# Patient Record
Sex: FEMALE | Race: BLACK OR AFRICAN AMERICAN | NOT HISPANIC OR LATINO | Employment: FULL TIME | URBAN - METROPOLITAN AREA
[De-identification: names, ages, dates, MRNs, and addresses within clinical notes are randomized per-mention and may not be internally consistent; named-entity substitution may affect disease eponyms.]

---

## 2017-09-24 ENCOUNTER — HOSPITAL ENCOUNTER (EMERGENCY)
Facility: HOSPITAL | Age: 22
Discharge: HOME | End: 2017-09-24
Payer: MEDICAID

## 2017-09-24 VITALS
HEIGHT: 63 IN | RESPIRATION RATE: 18 BRPM | OXYGEN SATURATION: 99 % | TEMPERATURE: 98 F | HEART RATE: 80 BPM | BODY MASS INDEX: 31.71 KG/M2 | DIASTOLIC BLOOD PRESSURE: 72 MMHG | SYSTOLIC BLOOD PRESSURE: 127 MMHG | WEIGHT: 179 LBS

## 2017-09-24 DIAGNOSIS — M76.60 ACHILLES TENDINITIS, UNSPECIFIED LATERALITY: Primary | ICD-10-CM

## 2017-09-24 PROCEDURE — 99283 EMERGENCY DEPT VISIT LOW MDM: CPT

## 2017-09-24 RX ORDER — DICLOFENAC SODIUM 75 MG/1
75 TABLET, DELAYED RELEASE ORAL 2 TIMES DAILY
Qty: 20 TABLET | Refills: 0 | Status: SHIPPED | OUTPATIENT
Start: 2017-09-24 | End: 2018-01-23 | Stop reason: CLARIF

## 2017-09-24 RX ORDER — PREDNISONE 20 MG/1
40 TABLET ORAL DAILY
Qty: 10 TABLET | Refills: 0 | Status: SHIPPED | OUTPATIENT
Start: 2017-09-24 | End: 2017-09-29

## 2017-09-24 NOTE — ED NOTES
Pt examined by Robinson VILLANUEVA  without RN, educated on prescriptions, given discharge instructions and discharged to [facility]. See provider notes for exam.

## 2017-09-24 NOTE — ED PROVIDER NOTES
SCRIBE #1 NOTE: I, Nila Brown, am scribing for, and in the presence of, KAY Coyne. I have scribed the entire note.      History      Chief Complaint   Patient presents with    Ankle Pain     pt c/o R ankle pain, worse when walking, denies trauma/injury, x1 day       Review of patient's allergies indicates:   Allergen Reactions    Benadryl [diphenhydramine hcl]     Codeine         HPI   HPI    9/24/2017, 12:14 PM   History obtained from the patient      History of Present Illness: Opal Perez is a 22 y.o. female patient who presents to the Emergency Department for R heel pain which onset gradually this morning after waking. She denies any injury. Pt reports that she usually has problems with her heel. She states pain is worse with walking. Pt states that she tailgated yesterday and reports walking a lot in sandals. The pain has been constant and mild in severity. She describes the pain as pulling or tearing. No mitigating factors reported. No other sxs reported. Patient denies extremity weakness/numbness, joint swelling, decreased ROM of foot, paresthesias, ecchymosis, and all other sxs at this time. No further complaints or concerns at this time.       Arrival mode: Personal vehicle    PCP: KELSEY James     Past Medical History:  Past medical history reviewed not relevant      Past Surgical History:  Past surgical history reviewed not relevant      Family History:  Family history reviewed not relevant      Social History:  Social History    Social History Main Topics    Social History Main Topics    Smoking status: Unknown if ever smoked    Smokeless tobacco: Unknown if ever used    Alcohol Use: Unknown drinking history    Drug Use: Unknown if ever used    Sexual Activity: Unknown         ROS   Review of Systems   Constitutional: Negative for fever.   HENT: Negative for sore throat.    Respiratory: Negative for shortness of breath.    Cardiovascular: Negative for chest pain.  "  Gastrointestinal: Negative for nausea.   Genitourinary: Negative for dysuria.   Musculoskeletal: Negative for back pain, gait problem and joint swelling.        (+) R heel pain   Skin: Negative for color change and rash.   Neurological: Negative for weakness and numbness.        (-) paresthesias   Hematological: Does not bruise/bleed easily.   All other systems reviewed and are negative.      Physical Exam      Initial Vitals [09/24/17 1125]   BP Pulse Resp Temp SpO2   127/72 80 18 98 °F (36.7 °C) 99 %      MAP       90.33          Physical Exam  Nursing Notes and Vital Signs Reviewed.  Constitutional: Patient is in no acute distress. Awake and alert. Well-developed and well-nourished.  Head: Atraumatic. Normocephalic.  Eyes: PERRL. EOM intact. Conjunctivae are not pale. No scleral icterus.  ENT: Mucous membranes are moist. Oropharynx is clear and symmetric.    Neck: Supple. Full ROM.   Cardiovascular: Regular rate. Regular rhythm. No murmurs, rubs, or gallops. Distal pulses are 2+ and symmetric.  Pulmonary/Chest: No respiratory distress. Clear to auscultation bilaterally. No wheezing, rales, or rhonchi.  Abdominal: Soft. Non-distended.    Musculoskeletal: Moves all extremities. No obvious deformities. Tenderness along the right achilles at the insertion along the calcaneus. There is no swelling. No bony tenderness. Distal capillary refill takes less than 2 seconds.  PT and DP pulses are 2+ bilaterally.  Skin: Warm and dry.  Neurological:  Alert, awake, and appropriate.  Normal speech.  No acute focal neurological deficits are appreciated.  Psychiatric: Normal affect. Good eye contact. Appropriate in content.    ED Course    Procedures  ED Vital Signs:  Vitals:    09/24/17 1125   BP: 127/72   Pulse: 80   Resp: 18   Temp: 98 °F (36.7 °C)   TempSrc: Oral   SpO2: 99%   Weight: 81.2 kg (179 lb)   Height: 5' 3" (1.6 m)       The Emergency Provider reviewed the vital signs and test results, which are outlined " above.    ED Discussion     12:18 PM: Discussed pt dx and plan of tx. Informed pt to follow up with PCP. Informed patient to avoid wearing sandals while doing activities that require a lot of walking. Advised patient to wear comfortable shoes. All questions and concerns were addressed at this time. Pt expresses understanding of information and instructions, and is comfortable with plan to discharge. Pt is stable for discharge.    I discussed with patient and/or family/caretaker that evaluation in the ED does not suggest any emergent or life threatening medical conditions requiring immediate intervention beyond what was provided in the ED, and I believe patient is safe for discharge.  Regardless, an unremarkable evaluation in the ED does not preclude the development or presence of a serious of life threatening condition. As such, patient was instructed to return immediately for any worsening or change in current symptoms.      ED Medication(s):  Medications - No data to display    New Prescriptions    DICLOFENAC (VOLTAREN) 75 MG EC TABLET    Take 1 tablet (75 mg total) by mouth 2 (two) times daily.    PREDNISONE (DELTASONE) 20 MG TABLET    Take 2 tablets (40 mg total) by mouth once daily.       Follow-up Information     KELSEY James. Go in 2 days.    Specialty:  Family Medicine  Contact information:  [id number]  [place] [address] [phone]                    Medical Decision Making              Scribe Attestation:   Scribe #1: I performed the above scribed service and the documentation accurately describes the services I performed. I attest to the accuracy of the note.    Attending:   Physician Attestation Statement for Scribe #1: I, KAY Coyne, personally performed the services described in this documentation, as scribed by Nila Brown, in my presence, and it is both accurate and complete.         Attending Attestation:     Physician Attestation Statement for NP/PA:   I discussed  this assessment and plan of this patient with the NP/PA, but I did not personally examine the patient. The face to face encounter was performed by the NP/PA.              Clinical Impression       ICD-10-CM ICD-9-CM   1. Achilles tendinitis, unspecified laterality M76.60 726.71       Disposition:   Disposition: Discharged  Condition: Stable           KAY Gutierrez  09/24/17 1223       Peter Linares MD  09/24/17 7824

## 2018-01-23 ENCOUNTER — HOSPITAL ENCOUNTER (EMERGENCY)
Facility: HOSPITAL | Age: 23
Discharge: HOME | End: 2018-01-23
Payer: MEDICAID

## 2018-01-23 VITALS
RESPIRATION RATE: 18 BRPM | BODY MASS INDEX: 30.54 KG/M2 | OXYGEN SATURATION: 97 % | HEART RATE: 96 BPM | HEIGHT: 64 IN | WEIGHT: 178.88 LBS | DIASTOLIC BLOOD PRESSURE: 90 MMHG | SYSTOLIC BLOOD PRESSURE: 127 MMHG | TEMPERATURE: 100 F

## 2018-01-23 DIAGNOSIS — R50.9 FEVER: ICD-10-CM

## 2018-01-23 DIAGNOSIS — R68.89 FLU-LIKE SYMPTOMS: ICD-10-CM

## 2018-01-23 DIAGNOSIS — B34.9 VIRAL SYNDROME: Primary | ICD-10-CM

## 2018-01-23 LAB
FLUAV AG SPEC QL IA: NEGATIVE
FLUBV AG SPEC QL IA: NEGATIVE
SPECIMEN SOURCE: NORMAL

## 2018-01-23 PROCEDURE — 25000003 PHARM REV CODE 250: Performed by: PHYSICIAN ASSISTANT

## 2018-01-23 PROCEDURE — 87400 INFLUENZA A/B EACH AG IA: CPT | Mod: 59

## 2018-01-23 PROCEDURE — 99284 EMERGENCY DEPT VISIT MOD MDM: CPT

## 2018-01-23 RX ORDER — ACETAMINOPHEN 500 MG
1000 TABLET ORAL
Status: COMPLETED | OUTPATIENT
Start: 2018-01-23 | End: 2018-01-23

## 2018-01-23 RX ORDER — PROMETHAZINE HYDROCHLORIDE AND DEXTROMETHORPHAN HYDROBROMIDE 6.25; 15 MG/5ML; MG/5ML
SYRUP ORAL
Qty: 120 ML | Refills: 0 | Status: SHIPPED | OUTPATIENT
Start: 2018-01-23 | End: 2018-02-22 | Stop reason: CLARIF

## 2018-01-23 RX ADMIN — ACETAMINOPHEN 1000 MG: 500 TABLET ORAL at 01:01

## 2018-01-23 NOTE — ED PROVIDER NOTES
Encounter Date: 2018       History     Chief Complaint   Patient presents with    General Illness     pt c/o fever, chills, body aches x2 days     The patient presents to the ER for an emergent evaluation due to cold and flu symptoms for the past 24-48 hours. She reports having fever, chills, cough, congestion, nasal drainage, and body aches. She states that the degree is mild to moderate. She states that the course is constant. She denies any additional symptoms. She did not get a flu shot this year. She denies any pre-arrival treatment. 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           Review of patient's allergies indicates:   Allergen Reactions    Benadryl [diphenhydramine hcl] Hives    Codeine Hives     Past Medical History:   Diagnosis Date    Anemia     Asthma      Past Surgical History:   Procedure Laterality Date    INDUCED       TONSILLECTOMY, ADENOIDECTOMY       No family history on file.  Social History   Substance Use Topics    Smoking status: Never Smoker    Smokeless tobacco: Never Used    Alcohol use Yes     Review of Systems   Constitutional: Positive for chills and fever. Negative for activity change.   HENT: Positive for congestion and rhinorrhea. Negative for ear pain, sore throat and trouble swallowing.    Eyes: Negative for discharge and redness.   Respiratory: Positive for cough. Negative for shortness of breath, wheezing and stridor.    Cardiovascular: Negative for chest pain, palpitations and leg swelling.   Gastrointestinal: Negative for abdominal pain, blood in stool, diarrhea, nausea and vomiting.   Genitourinary: Negative for decreased urine volume, dysuria, flank pain, frequency, menstrual problem and pelvic pain.   Musculoskeletal: Positive for myalgias. Negative for arthralgias, back pain, gait problem and neck pain.   Skin: Negative for rash.   Neurological: Negative for dizziness, seizures, syncope, weakness, light-headedness, numbness and  headaches.   Psychiatric/Behavioral: Negative for confusion.       Physical Exam     Initial Vitals [01/23/18 1238]   BP Pulse Resp Temp SpO2   (!) 127/90 96 18 (!) 102.1 °F (38.9 °C) 97 %      MAP       102.33         Physical Exam    Nursing note and vitals reviewed.  Constitutional: She appears well-developed and well-nourished. She is not diaphoretic.   Alert and ambulatory. Appears to be in mild distress. Non-toxic appearance presently.    HENT:   Mouth/Throat: Oropharynx is clear and moist.   Swollen nasal mucosa. Rhinorrhea. Normal otic exam. No adenopathy. Clear throat. No hoarseness.    Eyes: Conjunctivae are normal. Pupils are equal, round, and reactive to light. Right eye exhibits no discharge. Left eye exhibits no discharge. No scleral icterus.   Neck: Normal range of motion. Neck supple.   Non-tender. No nuchal rigidity.    Cardiovascular: Normal rate, regular rhythm and intact distal pulses.   Pulmonary/Chest: Breath sounds normal. No respiratory distress. She has no wheezes. She has no rhonchi. She has no rales.   Occasional cough. No exertional dyspnea. No tachypnea or hypoxia.    Abdominal: Soft. She exhibits no distension. There is no tenderness. There is no rebound and no guarding.   Benign abdomen.    Musculoskeletal: Normal range of motion. She exhibits no edema.   Lymphadenopathy:     She has no cervical adenopathy.   Neurological: She is alert and oriented to person, place, and time. She has normal strength. No cranial nerve deficit or sensory deficit.   Normal gait. No focal deficit presently.    Skin: Skin is warm and dry. No rash noted.   Psychiatric: She has a normal mood and affect. Her behavior is normal.         ED Course   Procedures  Labs Reviewed   INFLUENZA A AND B ANTIGEN     Results for orders placed or performed during the hospital encounter of 01/23/18   Influenza antigen Nasopharyngeal Swab   Result Value Ref Range    Influenza A Ag, EIA Negative Negative    Influenza B Ag, EIA  "Negative Negative    Flu A & B Source Nasopharyngeal Swab      Vitals:    01/23/18 1238 01/23/18 1420   BP: (!) 127/90    BP Location: Left arm    Patient Position: Sitting    Pulse: 96    Resp: 18    Temp: (!) 102.1 °F (38.9 °C) 100.1 °F (37.8 °C)   TempSrc: Oral Oral   SpO2: 97%    Weight: 81.1 kg (178 lb 14.5 oz)    Height: 5' 4" (1.626 m)      Imaging Results          X-Ray Chest PA And Lateral (Final result)  Result time 01/23/18 14:06:38    Final result by LUCIO Branch Sr., MD (01/23/18 14:06:38)                 Impression:        1. The lungs are clear.  2. There is a mild amount of dextroconvex curvature of the thoracic spine.      Electronically signed by: LUCIO BRANCH MD  Date:     01/23/18  Time:    14:06              Narrative:    Two-view chest x-ray    Clinical History:  Fever, unspecified    Finding: The size and contour of the heart are normal. The lungs are clear. There is no pneumothorax or pleural effusion. There is a mild amount of dextroconvex curvature of the thoracic spine.                                        Medical Decision Making:   History:   Old Medical Records: I decided to obtain old medical records.  Initial Assessment:   Cold and flu symptoms for < 48 hours   Differential Diagnosis:   Influenza, Viral syndrome, Pneumonia, Dehydration, Meningitis, Sepsis, Cold, Sinusitis, Bronchitis, URI, HIV, Pregnancy, UTI, etc   Clinical Tests:   Lab Tests: Ordered and Reviewed                   ED Course      Clinical Impression:   The primary encounter diagnosis was Viral syndrome. Diagnoses of Fever and Flu-like symptoms were also pertinent to this visit.    Disposition:   Disposition: Discharged  Condition: Stable                        Rich Keith PA-C  01/23/18 1425    "

## 2018-02-22 ENCOUNTER — HOSPITAL ENCOUNTER (EMERGENCY)
Facility: HOSPITAL | Age: 23
Discharge: HOME | End: 2018-02-22
Payer: MEDICAID

## 2018-02-22 VITALS
RESPIRATION RATE: 16 BRPM | HEART RATE: 86 BPM | OXYGEN SATURATION: 96 % | DIASTOLIC BLOOD PRESSURE: 83 MMHG | WEIGHT: 180.13 LBS | TEMPERATURE: 99 F | HEIGHT: 64 IN | SYSTOLIC BLOOD PRESSURE: 139 MMHG | BODY MASS INDEX: 30.75 KG/M2

## 2018-02-22 DIAGNOSIS — O21.9 VOMITING OR NAUSEA OF PREGNANCY: ICD-10-CM

## 2018-02-22 DIAGNOSIS — Z32.01 ENCOUNTER FOR PREGNANCY TEST, RESULT POSITIVE: Primary | ICD-10-CM

## 2018-02-22 LAB
B-HCG UR QL: POSITIVE
BILIRUB UR QL STRIP: NEGATIVE
CLARITY UR: CLEAR
COLOR UR: YELLOW
GLUCOSE UR QL STRIP: NEGATIVE
HGB UR QL STRIP: ABNORMAL
KETONES UR QL STRIP: NEGATIVE
LEUKOCYTE ESTERASE UR QL STRIP: ABNORMAL
MICROSCOPIC COMMENT: ABNORMAL
NITRITE UR QL STRIP: NEGATIVE
PH UR STRIP: 6 [PH] (ref 5–8)
PROT UR QL STRIP: NEGATIVE
RBC #/AREA URNS HPF: 3 /HPF (ref 0–4)
SP GR UR STRIP: 1.02 (ref 1–1.03)
URN SPEC COLLECT METH UR: ABNORMAL
UROBILINOGEN UR STRIP-ACNC: NEGATIVE EU/DL
WBC #/AREA URNS HPF: 8 /HPF (ref 0–5)

## 2018-02-22 PROCEDURE — 81025 URINE PREGNANCY TEST: CPT

## 2018-02-22 PROCEDURE — 99283 EMERGENCY DEPT VISIT LOW MDM: CPT

## 2018-02-22 PROCEDURE — 81000 URINALYSIS NONAUTO W/SCOPE: CPT

## 2018-02-22 RX ORDER — NORGESTIMATE AND ETHINYL ESTRADIOL 7DAYSX3 LO
1 KIT ORAL DAILY
COMMUNITY
End: 2019-09-16

## 2018-02-22 RX ORDER — PROMETHAZINE HYDROCHLORIDE 25 MG/1
25 TABLET ORAL EVERY 6 HOURS PRN
Qty: 15 TABLET | Refills: 0 | Status: SHIPPED | OUTPATIENT
Start: 2018-02-22 | End: 2019-09-16

## 2019-09-16 ENCOUNTER — HOSPITAL ENCOUNTER (EMERGENCY)
Facility: HOSPITAL | Age: 24
Discharge: HOME | End: 2019-09-16
Attending: EMERGENCY MEDICINE
Payer: MEDICAID

## 2019-09-16 VITALS
SYSTOLIC BLOOD PRESSURE: 145 MMHG | RESPIRATION RATE: 16 BRPM | TEMPERATURE: 99 F | BODY MASS INDEX: 30.93 KG/M2 | HEIGHT: 64 IN | DIASTOLIC BLOOD PRESSURE: 78 MMHG | OXYGEN SATURATION: 100 % | HEART RATE: 75 BPM | WEIGHT: 181.19 LBS

## 2019-09-16 DIAGNOSIS — R10.2 PELVIC PAIN: Primary | ICD-10-CM

## 2019-09-16 LAB
ALBUMIN SERPL BCP-MCNC: 3.8 G/DL (ref 3.5–5.2)
ALP SERPL-CCNC: 49 U/L (ref 55–135)
ALT SERPL W/O P-5'-P-CCNC: 12 U/L (ref 10–44)
ANION GAP SERPL CALC-SCNC: 8 MMOL/L (ref 8–16)
AST SERPL-CCNC: 14 U/L (ref 10–40)
B-HCG UR QL: NEGATIVE
BACTERIA GENITAL QL WET PREP: ABNORMAL
BASOPHILS # BLD AUTO: 0.01 K/UL (ref 0–0.2)
BASOPHILS NFR BLD: 0.3 % (ref 0–1.9)
BILIRUB SERPL-MCNC: 0.4 MG/DL (ref 0.1–1)
BILIRUB UR QL STRIP: NEGATIVE
BUN SERPL-MCNC: 12 MG/DL (ref 6–20)
C TRACH DNA SPEC QL NAA+PROBE: DETECTED
CALCIUM SERPL-MCNC: 9.3 MG/DL (ref 8.7–10.5)
CHLORIDE SERPL-SCNC: 105 MMOL/L (ref 95–110)
CLARITY UR: ABNORMAL
CLUE CELLS VAG QL WET PREP: ABNORMAL
CO2 SERPL-SCNC: 28 MMOL/L (ref 23–29)
COLOR UR: YELLOW
CREAT SERPL-MCNC: 0.8 MG/DL (ref 0.5–1.4)
DIFFERENTIAL METHOD: ABNORMAL
EOSINOPHIL # BLD AUTO: 0.1 K/UL (ref 0–0.5)
EOSINOPHIL NFR BLD: 1.8 % (ref 0–8)
ERYTHROCYTE [DISTWIDTH] IN BLOOD BY AUTOMATED COUNT: 13.4 % (ref 11.5–14.5)
EST. GFR  (AFRICAN AMERICAN): >60 ML/MIN/1.73 M^2
EST. GFR  (NON AFRICAN AMERICAN): >60 ML/MIN/1.73 M^2
FILAMENT FUNGI VAG WET PREP-#/AREA: ABNORMAL
GLUCOSE SERPL-MCNC: 90 MG/DL (ref 70–110)
GLUCOSE UR QL STRIP: NEGATIVE
HCT VFR BLD AUTO: 38 % (ref 37–48.5)
HGB BLD-MCNC: 12.5 G/DL (ref 12–16)
HGB UR QL STRIP: NEGATIVE
KETONES UR QL STRIP: NEGATIVE
LEUKOCYTE ESTERASE UR QL STRIP: NEGATIVE
LIPASE SERPL-CCNC: 33 U/L (ref 4–60)
LYMPHOCYTES # BLD AUTO: 1.6 K/UL (ref 1–4.8)
LYMPHOCYTES NFR BLD: 48 % (ref 18–48)
MCH RBC QN AUTO: 31.5 PG (ref 27–31)
MCHC RBC AUTO-ENTMCNC: 32.9 G/DL (ref 32–36)
MCV RBC AUTO: 96 FL (ref 82–98)
MONOCYTES # BLD AUTO: 0.4 K/UL (ref 0.3–1)
MONOCYTES NFR BLD: 11.4 % (ref 4–15)
N GONORRHOEA DNA SPEC QL NAA+PROBE: NOT DETECTED
NEUTROPHILS # BLD AUTO: 1.3 K/UL (ref 1.8–7.7)
NEUTROPHILS NFR BLD: 38.5 % (ref 38–73)
NITRITE UR QL STRIP: NEGATIVE
PH UR STRIP: 8 [PH] (ref 5–8)
PLATELET # BLD AUTO: 258 K/UL (ref 150–350)
PMV BLD AUTO: 9.9 FL (ref 9.2–12.9)
POTASSIUM SERPL-SCNC: 3.9 MMOL/L (ref 3.5–5.1)
PROT SERPL-MCNC: 7.5 G/DL (ref 6–8.4)
PROT UR QL STRIP: NEGATIVE
RBC # BLD AUTO: 3.97 M/UL (ref 4–5.4)
SODIUM SERPL-SCNC: 141 MMOL/L (ref 136–145)
SP GR UR STRIP: 1.01 (ref 1–1.03)
SPECIMEN SOURCE: ABNORMAL
T VAGINALIS GENITAL QL WET PREP: ABNORMAL
URN SPEC COLLECT METH UR: ABNORMAL
UROBILINOGEN UR STRIP-ACNC: 1 EU/DL
WBC # BLD AUTO: 3.42 K/UL (ref 3.9–12.7)
WBC #/AREA VAG WET PREP: ABNORMAL
YEAST GENITAL QL WET PREP: ABNORMAL

## 2019-09-16 PROCEDURE — 99284 EMERGENCY DEPT VISIT MOD MDM: CPT | Mod: 25

## 2019-09-16 PROCEDURE — 81003 URINALYSIS AUTO W/O SCOPE: CPT

## 2019-09-16 PROCEDURE — 96372 THER/PROPH/DIAG INJ SC/IM: CPT

## 2019-09-16 PROCEDURE — 87210 SMEAR WET MOUNT SALINE/INK: CPT

## 2019-09-16 PROCEDURE — 25000003 PHARM REV CODE 250: Performed by: PHYSICIAN ASSISTANT

## 2019-09-16 PROCEDURE — 36000 PLACE NEEDLE IN VEIN: CPT

## 2019-09-16 PROCEDURE — 80053 COMPREHEN METABOLIC PANEL: CPT

## 2019-09-16 PROCEDURE — 85025 COMPLETE CBC W/AUTO DIFF WBC: CPT

## 2019-09-16 PROCEDURE — 63600175 PHARM REV CODE 636 W HCPCS: Performed by: PHYSICIAN ASSISTANT

## 2019-09-16 PROCEDURE — 83690 ASSAY OF LIPASE: CPT

## 2019-09-16 PROCEDURE — 87491 CHLMYD TRACH DNA AMP PROBE: CPT

## 2019-09-16 PROCEDURE — 81025 URINE PREGNANCY TEST: CPT

## 2019-09-16 RX ORDER — DOXYCYCLINE HYCLATE 100 MG
100 TABLET ORAL
Status: COMPLETED | OUTPATIENT
Start: 2019-09-16 | End: 2019-09-16

## 2019-09-16 RX ORDER — DOXYCYCLINE 100 MG/1
100 CAPSULE ORAL 2 TIMES DAILY
Qty: 28 CAPSULE | Refills: 0 | Status: SHIPPED | OUTPATIENT
Start: 2019-09-16 | End: 2019-09-30

## 2019-09-16 RX ORDER — PAROXETINE 10 MG/1
10 TABLET, FILM COATED ORAL DAILY
COMMUNITY

## 2019-09-16 RX ORDER — CEFTRIAXONE 250 MG/1
250 INJECTION, POWDER, FOR SOLUTION INTRAMUSCULAR; INTRAVENOUS
Status: COMPLETED | OUTPATIENT
Start: 2019-09-16 | End: 2019-09-16

## 2019-09-16 RX ORDER — ALBUTEROL SULFATE 0.83 MG/ML
2.5 SOLUTION RESPIRATORY (INHALATION) EVERY 6 HOURS PRN
COMMUNITY

## 2019-09-16 RX ADMIN — CEFTRIAXONE SODIUM 250 MG: 250 INJECTION, POWDER, FOR SOLUTION INTRAMUSCULAR; INTRAVENOUS at 12:09

## 2019-09-16 RX ADMIN — DOXYCYCLINE HYCLATE 100 MG: 100 TABLET, COATED ORAL at 12:09

## 2019-09-16 NOTE — ED PROVIDER NOTES
History      Chief Complaint   Patient presents with    Pelvic Pain     and abdominal pain x 2-3 weeks, intermittent nausea and vomiting.       Review of patient's allergies indicates:   Allergen Reactions    Benadryl [diphenhydramine hcl] Hives    Codeine Hives        HPI   HPI    2019, 8:51 AM   History obtained from the patient      History of Present Illness: Opal Perez is a 24 y.o. female patient who presents to the Emergency Department for intermittent pelvic pain for 2-3 weeks.  Vomited x 1 last night.  Symptoms are moderate in severity.     No further complaints or concerns at this time.           PCP: KELSEY James       Past Medical History:  Past Medical History:   Diagnosis Date    Anemia     Asthma          Past Surgical History:  Past Surgical History:   Procedure Laterality Date    INDUCED       TONSILLECTOMY, ADENOIDECTOMY             Family History:  History reviewed. No pertinent family history.        Social History:  Social History     Tobacco Use    Smoking status: Never Smoker    Smokeless tobacco: Never Used   Substance and Sexual Activity    Alcohol use: Yes     Comment: occasionally    Drug use: No    Sexual activity: Yes     Partners: Male     Birth control/protection: OCP       ROS     Review of Systems   Constitutional: Negative for chills and fever.   HENT: Negative for facial swelling and trouble swallowing.    Eyes: Negative for discharge, redness and visual disturbance.   Respiratory: Negative for chest tightness and shortness of breath.    Cardiovascular: Negative for chest pain and leg swelling.   Gastrointestinal: Positive for vomiting. Negative for diarrhea.   Genitourinary: Positive for pelvic pain. Negative for decreased urine volume and dysuria.   Musculoskeletal: Negative for joint swelling and neck stiffness.   Skin: Negative for rash and wound.   Neurological: Negative for syncope and facial asymmetry.   All other systems  "reviewed and are negative.      Physical Exam      Initial Vitals [09/16/19 0850]   BP Pulse Resp Temp SpO2   (!) 145/78 75 16 99 °F (37.2 °C) 100 %      MAP       --         Physical Exam  Vital signs and nursing notes reviewed.  Constitutional: Patient is in NAD. Awake and alert. Well-developed and well-nourished.  Head: Atraumatic. Normocephalic.  Eyes: PERRL. EOM intact. Conjunctivae nl. No scleral icterus.  ENT: Mucous membranes are moist. Oropharynx is clear.  Neck: Supple. No JVD. No lymphadenopathy.  No meningismus  Cardiovascular: Regular rate and rhythm. No murmurs, rubs, or gallops. Distal pulses are 2+ and symmetric.  Pulmonary/Chest: No respiratory distress. Clear to auscultation bilaterally. No wheezing, rales, or rhonchi.  Abdominal: Soft. Non-distended. No TTP. No rebound, guarding, or rigidity. Good bowel sounds.  Genitourinary: No CVA tenderness.  +discharge. +cmt. No adnexal ttp.  Musculoskeletal: Moves all extremities. No edema.   Skin: Warm and dry.  Neurological: Awake and alert. No acute focal neurological deficits are appreciated.  Psychiatric: Normal affect. Good eye contact. Appropriate in content.      ED Course          Procedures  ED Vital Signs:  Vitals:    09/16/19 0850   BP: (!) 145/78   Pulse: 75   Resp: 16   Temp: 99 °F (37.2 °C)   TempSrc: Oral   SpO2: 100%   Weight: 82.2 kg (181 lb 3.5 oz)   Height: 5' 4" (1.626 m)         Results for orders placed or performed during the hospital encounter of 09/16/19   C. trachomatis/N. gonorrhoeae by AMP DNA [facility]; Cervix   Result Value Ref Range    Chlamydia, Amplified DNA Detected (A) Not Detected    N gonorrhoeae, amplified DNA Not Detected Not Detected   CBC auto differential   Result Value Ref Range    WBC 3.42 (L) 3.90 - 12.70 K/uL    RBC 3.97 (L) 4.00 - 5.40 M/uL    Hemoglobin 12.5 12.0 - 16.0 g/dL    Hematocrit 38.0 37.0 - 48.5 %    Mean Corpuscular Volume 96 82 - 98 fL    Mean Corpuscular Hemoglobin 31.5 (H) 27.0 - 31.0 pg    Mean " Corpuscular Hemoglobin Conc 32.9 32.0 - 36.0 g/dL    RDW 13.4 11.5 - 14.5 %    Platelets 258 150 - 350 K/uL    MPV 9.9 9.2 - 12.9 fL    Gran # (ANC) 1.3 (L) 1.8 - 7.7 K/uL    Lymph # 1.6 1.0 - 4.8 K/uL    Mono # 0.4 0.3 - 1.0 K/uL    Eos # 0.1 0.0 - 0.5 K/uL    Baso # 0.01 0.00 - 0.20 K/uL    Gran% 38.5 38.0 - 73.0 %    Lymph% 48.0 18.0 - 48.0 %    Mono% 11.4 4.0 - 15.0 %    Eosinophil% 1.8 0.0 - 8.0 %    Basophil% 0.3 0.0 - 1.9 %    Differential Method Automated    Comprehensive metabolic panel   Result Value Ref Range    Sodium 141 136 - 145 mmol/L    Potassium 3.9 3.5 - 5.1 mmol/L    Chloride 105 95 - 110 mmol/L    CO2 28 23 - 29 mmol/L    Glucose 90 70 - 110 mg/dL    BUN, Bld 12 6 - 20 mg/dL    Creatinine 0.8 0.5 - 1.4 mg/dL    Calcium 9.3 8.7 - 10.5 mg/dL    Total Protein 7.5 6.0 - 8.4 g/dL    Albumin 3.8 3.5 - 5.2 g/dL    Total Bilirubin 0.4 0.1 - 1.0 mg/dL    Alkaline Phosphatase 49 (L) 55 - 135 U/L    AST 14 10 - 40 U/L    ALT 12 10 - 44 U/L    Anion Gap 8 8 - 16 mmol/L    eGFR if African American >60 >60 mL/min/1.73 m^2    eGFR if non African American >60 >60 mL/min/1.73 m^2   Lipase   Result Value Ref Range    Lipase 33 4 - 60 U/L   Urinalysis, Reflex to Urine Culture Urine, Clean Catch   Result Value Ref Range    Specimen UA Urine, Clean Catch     Color, UA Yellow Yellow, Straw, Anita    Appearance, UA Hazy (A) Clear    pH, UA 8.0 5.0 - 8.0    Specific Gravity, UA 1.015 1.005 - 1.030    Protein, UA Negative Negative    Glucose, UA Negative Negative    Ketones, UA Negative Negative    Bilirubin (UA) Negative Negative    Occult Blood UA Negative Negative    Nitrite, UA Negative Negative    Urobilinogen, UA 1.0 <2.0 EU/dL    Leukocytes, UA Negative Negative   Pregnancy, urine rapid   Result Value Ref Range    Preg Test, Ur Negative    Vaginal Screen Vagina   Result Value Ref Range    Trichomonas None None    Clue Cells None None    Budding Yeast None None    Fungal Hyphae None None    WBC - Vaginal Screen  Few (A) None    Bacteria - Vaginal Screen Occasional (A) None    Wet Prep Source Vagina              Imaging Results:  Imaging Results    None            The Emergency Provider reviewed the vital signs and test results, which are outlined above.    ED Discussion             Medication(s) given in the ER:  Medications   cefTRIAXone injection 250 mg (250 mg Intramuscular Given 9/16/19 1226)   doxycycline tablet 100 mg (100 mg Oral Given 9/16/19 1226)           Follow-up Information     KELSEY James In 2 days.    Specialty:  Family Medicine  Contact information:  [address] [address] [phone]                          Medication List      START taking these medications    doxycycline 100 MG Cap  Commonly known as:  VIBRAMYCIN  Take 1 capsule (100 mg total) by mouth 2 (two) times daily. for 14 days        ASK your doctor about these medications    albuterol 2.5 mg /3 mL (0.083 %) nebulizer solution  Commonly known as:  PROVENTIL     paroxetine 10 MG tablet  Commonly known as:  PAXIL           Where to Get Your Medications      You can get these medications from any pharmacy    Bring a paper prescription for each of these medications  · doxycycline 100 MG Cap             Medical Decision Making      Discussed importance of birth control while taking doxycycline, grey teeth.  Pt understands and agrees    All findings were reviewed with the patient/family in detail.   All remaining questions and concerns were addressed at that time.  Patient/family has been counseled regarding the need for follow-up as well as the indication to return to the emergency room should new or worrisome developments occur.        MDM               Clinical Impression:        ICD-10-CM ICD-9-CM   1. Pelvic pain R10.2 [id number]             Carine Metzger PA-C  09/18/19 [id number]

## 2019-09-16 NOTE — ED NOTES
Pt. C/o abdominal pain x 2-3 weeks, intermittent nausea and vomiting. Headache. Pain 8/10  Patient identifiers verified and correct for Opal Verma Chris.  LOC: The patient is awake, alert and aware of environment with an appropriate affect, the patient is oriented x 3 and speaking appropriately.  APPEARANCE: Patient resting comfortably and in no acute distress, patient is clean and well groomed, patient's clothing is properly fastened.  SKIN: The skin is warm and dry, color consistent with ethnicity, patient has normal skin turgor and moist mucus membranes, skin intact, no breakdown or bruising noted.  MUSCULOSKELETAL: Patient moving all extremities spontaneously.  RESPIRATORY: Airway is open and patent, respirations are spontaneous.  CARDIAC: Patient has a normal rate, no periphreal edema noted, capillary refill < 3 seconds.  ABDOMEN: Soft,tender to palpation on all quads.

## 2019-09-17 ENCOUNTER — TELEPHONE (OUTPATIENT)
Dept: EMERGENCY MEDICINE | Facility: HOSPITAL | Age: 24
End: 2019-09-17

## 2022-02-21 NOTE — ED PROVIDER NOTES
SCRIBE #1 NOTE: I, Nila Brown, am scribing for, and in the presence of, KAY Negron. I have scribed the entire note.      History      Chief Complaint   Patient presents with    Nausea     c/o nausea with left flank pain for 4 days       Review of patient's allergies indicates:   Allergen Reactions    Benadryl [diphenhydramine hcl] Hives    Codeine Hives        HPI   HPI    2018, 10:31 PM   History obtained from the patient      History of Present Illness: Opal Perez is a 22 y.o. female patient who presents to the Emergency Department for nausea which onset gradually 3 days ago. Sxs are constant and mild in severity. Patient also reports having intermittent abdominal cramping since yesterday but states that she has not taken anything for the pain. Reports that her menses is 1 week late but she does have a hx of irregular menses. She takes an OCP but reports that pregnancy is possible. No mitigating or exacerbating factors reported. Patient denies fever, chills, vomiting, diarrhea, constipation, dysuria, hematuria, frequency, back pain, and all other sxs at this time. No further complaints or concerns at this time.     Arrival mode: Personal vehicle    PCP: KELSEY James       Past Medical History:  Past Medical History:   Diagnosis Date    Anemia     Asthma        Past Surgical History:  Past Surgical History:   Procedure Laterality Date    INDUCED       TONSILLECTOMY, ADENOIDECTOMY           Family History:  History reviewed. No pertinent family history.    Social History:  Social History     Social History Main Topics    Smoking status: Never Smoker    Smokeless tobacco: Never Used    Alcohol use Yes    Drug use: No    Sexual activity: Yes     Partners: Male     Birth control/ protection: OCP       ROS   Review of Systems   Constitutional: Negative for chills and fever.   HENT: Negative for sore throat.    Respiratory: Negative for shortness of breath.   "  Cardiovascular: Negative for chest pain.   Gastrointestinal: Positive for abdominal pain (intermittent cramping) and nausea. Negative for blood in stool, constipation, diarrhea and vomiting.   Genitourinary: Negative for dysuria, frequency, hematuria, pelvic pain and vaginal discharge.   Musculoskeletal: Negative for back pain.   Skin: Negative for rash.   Neurological: Negative for weakness.   Hematological: Does not bruise/bleed easily.   All other systems reviewed and are negative.      Physical Exam      Initial Vitals [02/22/18 2202]   BP Pulse Resp Temp SpO2   139/83 86 16 98.6 °F (37 °C) 96 %      MAP       101.67          Physical Exam  Nursing Notes and Vital Signs Reviewed.  Constitutional: Patient is in no acute distress. Awake and alert. Well-developed and well-nourished.  Head: Atraumatic. Normocephalic.  Eyes: PERRL. EOM intact. Conjunctivae are not pale. No scleral icterus.  ENT: Mucous membranes are moist. Oropharynx is clear and symmetric.    Neck: Supple. Full ROM.   Cardiovascular: Regular rate. Regular rhythm. No murmurs, rubs, or gallops. Distal pulses are 2+ and symmetric.  Pulmonary/Chest: No respiratory distress. Clear to auscultation bilaterally. No wheezing, rales, or rhonchi.  Abdominal: Soft and non-distended.  There is no tenderness.  No rebound, guarding, or rigidity.  Good bowel sounds.    : No CVA TTP.  Musculoskeletal: Moves all extremities. No obvious deformities.   Skin: Warm and dry.  Neurological:  Alert, awake, and appropriate.  Normal speech. No acute focal neurological deficits are appreciated.  Psychiatric: Normal affect. Good eye contact. Appropriate in content.    ED Course    Procedures  ED Vital Signs:  Vitals:    02/22/18 2202   BP: 139/83   Pulse: 86   Resp: 16   Temp: 98.6 °F (37 °C)   SpO2: 96%   Weight: 81.7 kg (180 lb 1.9 oz)   Height: 5' 4" (1.626 m)       Abnormal Lab Results:  Labs Reviewed   URINALYSIS - Abnormal; Notable for the following:        Result " Value    Occult Blood UA Trace (*)     Leukocytes, UA 1+ (*)     All other components within normal limits   URINALYSIS MICROSCOPIC - Abnormal; Notable for the following:     WBC, UA 8 (*)     All other components within normal limits   PREGNANCY TEST, URINE RAPID        All Lab Results:  Results for orders placed or performed during the hospital encounter of 02/22/18   Pregnancy, urine rapid   Result Value Ref Range    Preg Test, Ur Positive    Urinalysis   Result Value Ref Range    Specimen UA Urine, Clean Catch     Color, UA Yellow Yellow, Straw, Anita    Appearance, UA Clear Clear    pH, UA 6.0 5.0 - 8.0    Specific Gravity, UA 1.025 1.005 - 1.030    Protein, UA Negative Negative    Glucose, UA Negative Negative    Ketones, UA Negative Negative    Bilirubin (UA) Negative Negative    Occult Blood UA Trace (A) Negative    Nitrite, UA Negative Negative    Urobilinogen, UA Negative <2.0 EU/dL    Leukocytes, UA 1+ (A) Negative   Urinalysis Microscopic   Result Value Ref Range    RBC, UA 3 0 - 4 /hpf    WBC, UA 8 (H) 0 - 5 /hpf    Microscopic Comment SEE COMMENT      The Emergency Provider reviewed the vital signs and test results, which are outlined above.    ED Discussion     11:21 PM: Reassessed pt at this time. Discussed with pt lab results. D/w patient that pregnancy test is positive. Discussed pt dx and plan of tx. Informed pt to follow up with OB/GYN to establish prenatal care. All questions and concerns were addressed at this time. Pt expresses understanding of information and instructions, and is comfortable with plan to discharge. Pt is stable for discharge.    Regarding POSITIVE PREGNANCY TEST, patient was educated on antepartum period including:  normal pregnancy lasts about 40 weeks; first trimester lasts from the last period through the 12th week of pregnancy; the second trimester lasts from the 13th week of pregnancy through the 23rd week; and the third trimester lasts from the 24th week of pregnancy  until delivery.  Advised patient to seek care immediately if she:  develops a severe headache that does not go away; has new or increased vision changes (i.e.,  blurred or spotted vision);  has new or increased swelling in the face or hands; has pain or cramping in abdomen or lower back; or has vaginal bleeding.  Recommended that patient contact primary care provider or OB/GYN if she: has abdominal cramps, pressure, or tightening; has a change in vaginal discharge; cannot keep food or drinks down, and begin losing weight; develop chills or a fever; experience vaginal itching, burning, or pain; notice yellow, green, white, or foul-smelling vaginal discharge; has pain or burning with urination, less urine than usual, or pink or bloody urine; or has any questions or concerns about  pregnancy.  Patient was provided tips to staying healthy during pregnancy including: eating a variety of healthy foods (i.e., including fruits, vegetables, whole-grain breads, low-fat dairy foods, beans, lean meats, and fish low in mercury); drinking adequate amounts of liquids as directed; taking prenatal vitamins as directed; gaining appropriate amount of weight that is considered healthy for both mom and fetus; participates in moderate exercise; quits or refrains from smoking; avoids alcohol use; and talks to healthcare provider or pharmacist before  taking  any medicines.  Patient was advised to avoid hot tubs and saunas, toxoplasmosis (raw meat and infected cat feces), and traveling during the 3rd trimester. Reiterated to the patient the importance of receiving prenatal care and being compliant with all medications and treatment plans recommended by OB/GYN.     ED Medication(s):  Medications - No data to display    New Prescriptions    PROMETHAZINE (PHENERGAN) 25 MG TABLET    Take 1 tablet (25 mg total) by mouth every 6 (six) hours as needed for Nausea.       Follow-up Information     KELSEY James. Schedule an appointment as  soon as possible for a visit in 1 day.    Specialty:  Family Medicine  Contact information:  [address] [address] [phone]             Schedule an appointment as soon as possible for a visit  with [facility].    Specialty:  Obstetrics and Gynecology  Why:  Follow up with [facility] in the next 1-2 days for re-evaluation and further management.  Contact information:  [address] [facility] [address] [phone]  Additional information:  (off [facility]) 4th floor, Please check in for your appointment in the Women's Services Department located through the doorway to the left of the elevators.           [facility]    Specialty:  Emergency Medicine  Why:  If symptoms worsen in any way.  Contact information:  [address] [facility] [address] [phone]                  Medical Decision Making    Medical Decision Making:   Clinical Tests:   Lab Tests: Ordered and Reviewed           Scribe Attestation:   Scribe #1: I performed the above scribed service and the documentation accurately describes the services I performed. I attest to the accuracy of the note.    Attending:   Physician Attestation Statement for Scribe #1: I, KAY Negron, personally performed the services described in this documentation, as scribed by Nila Brown, in my presence, and it is both accurate and complete.          Clinical Impression       ICD-10-CM ICD-9-CM   1. Encounter for pregnancy test, result positive Z32.01 V72.42   2. Vomiting or nausea of pregnancy O21.9 643.90       Disposition:   Disposition: Discharged  Condition: Stable         Rich Keith PA-C  02/22/18 5215     20 85 25